# Patient Record
Sex: MALE | Race: WHITE | NOT HISPANIC OR LATINO | URBAN - METROPOLITAN AREA
[De-identification: names, ages, dates, MRNs, and addresses within clinical notes are randomized per-mention and may not be internally consistent; named-entity substitution may affect disease eponyms.]

---

## 2019-08-23 ENCOUNTER — INPATIENT (INPATIENT)
Facility: HOSPITAL | Age: 34
LOS: 3 days | Discharge: HOME | End: 2019-08-27
Attending: STUDENT IN AN ORGANIZED HEALTH CARE EDUCATION/TRAINING PROGRAM | Admitting: STUDENT IN AN ORGANIZED HEALTH CARE EDUCATION/TRAINING PROGRAM
Payer: COMMERCIAL

## 2019-08-23 VITALS
TEMPERATURE: 96 F | HEART RATE: 67 BPM | RESPIRATION RATE: 18 BRPM | OXYGEN SATURATION: 99 % | SYSTOLIC BLOOD PRESSURE: 149 MMHG | DIASTOLIC BLOOD PRESSURE: 88 MMHG

## 2019-08-23 LAB
ALBUMIN SERPL ELPH-MCNC: 4.3 G/DL — SIGNIFICANT CHANGE UP (ref 3.5–5.2)
ALP SERPL-CCNC: 77 U/L — SIGNIFICANT CHANGE UP (ref 30–115)
ALT FLD-CCNC: 15 U/L — SIGNIFICANT CHANGE UP (ref 0–41)
ANION GAP SERPL CALC-SCNC: 11 MMOL/L — SIGNIFICANT CHANGE UP (ref 7–14)
APPEARANCE UR: CLEAR — SIGNIFICANT CHANGE UP
AST SERPL-CCNC: 16 U/L — SIGNIFICANT CHANGE UP (ref 0–41)
BASOPHILS # BLD AUTO: 0.03 K/UL — SIGNIFICANT CHANGE UP (ref 0–0.2)
BASOPHILS NFR BLD AUTO: 0.3 % — SIGNIFICANT CHANGE UP (ref 0–1)
BILIRUB SERPL-MCNC: 0.4 MG/DL — SIGNIFICANT CHANGE UP (ref 0.2–1.2)
BILIRUB UR-MCNC: NEGATIVE — SIGNIFICANT CHANGE UP
BLD GP AB SCN SERPL QL: SIGNIFICANT CHANGE UP
BUN SERPL-MCNC: 8 MG/DL — LOW (ref 10–20)
CALCIUM SERPL-MCNC: 9.5 MG/DL — SIGNIFICANT CHANGE UP (ref 8.5–10.1)
CHLORIDE SERPL-SCNC: 98 MMOL/L — SIGNIFICANT CHANGE UP (ref 98–110)
CO2 SERPL-SCNC: 26 MMOL/L — SIGNIFICANT CHANGE UP (ref 17–32)
COLOR SPEC: SIGNIFICANT CHANGE UP
CREAT SERPL-MCNC: 0.8 MG/DL — SIGNIFICANT CHANGE UP (ref 0.7–1.5)
DIFF PNL FLD: NEGATIVE — SIGNIFICANT CHANGE UP
EOSINOPHIL # BLD AUTO: 0.4 K/UL — SIGNIFICANT CHANGE UP (ref 0–0.7)
EOSINOPHIL NFR BLD AUTO: 3.7 % — SIGNIFICANT CHANGE UP (ref 0–8)
GLUCOSE SERPL-MCNC: 112 MG/DL — HIGH (ref 70–99)
GLUCOSE UR QL: NEGATIVE — SIGNIFICANT CHANGE UP
HCT VFR BLD CALC: 43.8 % — SIGNIFICANT CHANGE UP (ref 42–52)
HGB BLD-MCNC: 15.2 G/DL — SIGNIFICANT CHANGE UP (ref 14–18)
IMM GRANULOCYTES NFR BLD AUTO: 0.4 % — HIGH (ref 0.1–0.3)
KETONES UR-MCNC: NEGATIVE — SIGNIFICANT CHANGE UP
LEUKOCYTE ESTERASE UR-ACNC: NEGATIVE — SIGNIFICANT CHANGE UP
LYMPHOCYTES # BLD AUTO: 1.76 K/UL — SIGNIFICANT CHANGE UP (ref 1.2–3.4)
LYMPHOCYTES # BLD AUTO: 16.4 % — LOW (ref 20.5–51.1)
MCHC RBC-ENTMCNC: 30.5 PG — SIGNIFICANT CHANGE UP (ref 27–31)
MCHC RBC-ENTMCNC: 34.7 G/DL — SIGNIFICANT CHANGE UP (ref 32–37)
MCV RBC AUTO: 88 FL — SIGNIFICANT CHANGE UP (ref 80–94)
MONOCYTES # BLD AUTO: 0.6 K/UL — SIGNIFICANT CHANGE UP (ref 0.1–0.6)
MONOCYTES NFR BLD AUTO: 5.6 % — SIGNIFICANT CHANGE UP (ref 1.7–9.3)
NEUTROPHILS # BLD AUTO: 7.93 K/UL — HIGH (ref 1.4–6.5)
NEUTROPHILS NFR BLD AUTO: 73.6 % — SIGNIFICANT CHANGE UP (ref 42.2–75.2)
NITRITE UR-MCNC: NEGATIVE — SIGNIFICANT CHANGE UP
NRBC # BLD: 0 /100 WBCS — SIGNIFICANT CHANGE UP (ref 0–0)
PH UR: 7 — SIGNIFICANT CHANGE UP (ref 5–8)
PLATELET # BLD AUTO: 203 K/UL — SIGNIFICANT CHANGE UP (ref 130–400)
POTASSIUM SERPL-MCNC: 4.2 MMOL/L — SIGNIFICANT CHANGE UP (ref 3.5–5)
POTASSIUM SERPL-SCNC: 4.2 MMOL/L — SIGNIFICANT CHANGE UP (ref 3.5–5)
PROT SERPL-MCNC: 6.6 G/DL — SIGNIFICANT CHANGE UP (ref 6–8)
PROT UR-MCNC: NEGATIVE — SIGNIFICANT CHANGE UP
RBC # BLD: 4.98 M/UL — SIGNIFICANT CHANGE UP (ref 4.7–6.1)
RBC # FLD: 12.6 % — SIGNIFICANT CHANGE UP (ref 11.5–14.5)
SODIUM SERPL-SCNC: 135 MMOL/L — SIGNIFICANT CHANGE UP (ref 135–146)
SP GR SPEC: 1.01 — LOW (ref 1.01–1.02)
UROBILINOGEN FLD QL: SIGNIFICANT CHANGE UP
WBC # BLD: 10.76 K/UL — SIGNIFICANT CHANGE UP (ref 4.8–10.8)
WBC # FLD AUTO: 10.76 K/UL — SIGNIFICANT CHANGE UP (ref 4.8–10.8)

## 2019-08-23 PROCEDURE — 99285 EMERGENCY DEPT VISIT HI MDM: CPT

## 2019-08-23 PROCEDURE — 99222 1ST HOSP IP/OBS MODERATE 55: CPT

## 2019-08-23 RX ORDER — MIRTAZAPINE 45 MG/1
15 TABLET, ORALLY DISINTEGRATING ORAL AT BEDTIME
Refills: 0 | Status: DISCONTINUED | OUTPATIENT
Start: 2019-08-23 | End: 2019-08-27

## 2019-08-23 RX ORDER — ONDANSETRON 8 MG/1
4 TABLET, FILM COATED ORAL EVERY 6 HOURS
Refills: 0 | Status: DISCONTINUED | OUTPATIENT
Start: 2019-08-23 | End: 2019-08-27

## 2019-08-23 RX ORDER — HEPARIN SODIUM 5000 [USP'U]/ML
5000 INJECTION INTRAVENOUS; SUBCUTANEOUS EVERY 8 HOURS
Refills: 0 | Status: DISCONTINUED | OUTPATIENT
Start: 2019-08-23 | End: 2019-08-27

## 2019-08-23 RX ORDER — MORPHINE SULFATE 50 MG/1
4 CAPSULE, EXTENDED RELEASE ORAL EVERY 4 HOURS
Refills: 0 | Status: DISCONTINUED | OUTPATIENT
Start: 2019-08-23 | End: 2019-08-23

## 2019-08-23 RX ORDER — MIRTAZAPINE 45 MG/1
15 TABLET, ORALLY DISINTEGRATING ORAL ONCE
Refills: 0 | Status: COMPLETED | OUTPATIENT
Start: 2019-08-23 | End: 2019-08-23

## 2019-08-23 RX ORDER — GABAPENTIN 400 MG/1
100 CAPSULE ORAL ONCE
Refills: 0 | Status: COMPLETED | OUTPATIENT
Start: 2019-08-23 | End: 2019-08-23

## 2019-08-23 RX ORDER — TRAZODONE HCL 50 MG
100 TABLET ORAL ONCE
Refills: 0 | Status: COMPLETED | OUTPATIENT
Start: 2019-08-23 | End: 2019-08-23

## 2019-08-23 RX ORDER — TRAZODONE HCL 50 MG
100 TABLET ORAL AT BEDTIME
Refills: 0 | Status: DISCONTINUED | OUTPATIENT
Start: 2019-08-23 | End: 2019-08-27

## 2019-08-23 RX ORDER — SODIUM CHLORIDE 9 MG/ML
1000 INJECTION, SOLUTION INTRAVENOUS ONCE
Refills: 0 | Status: COMPLETED | OUTPATIENT
Start: 2019-08-23 | End: 2019-08-23

## 2019-08-23 RX ORDER — MORPHINE SULFATE 50 MG/1
4 CAPSULE, EXTENDED RELEASE ORAL EVERY 6 HOURS
Refills: 0 | Status: DISCONTINUED | OUTPATIENT
Start: 2019-08-23 | End: 2019-08-26

## 2019-08-23 RX ORDER — SODIUM CHLORIDE 9 MG/ML
1000 INJECTION INTRAMUSCULAR; INTRAVENOUS; SUBCUTANEOUS
Refills: 0 | Status: DISCONTINUED | OUTPATIENT
Start: 2019-08-23 | End: 2019-08-24

## 2019-08-23 RX ORDER — GABAPENTIN 400 MG/1
100 CAPSULE ORAL AT BEDTIME
Refills: 0 | Status: DISCONTINUED | OUTPATIENT
Start: 2019-08-23 | End: 2019-08-24

## 2019-08-23 RX ORDER — ACETAMINOPHEN 500 MG
650 TABLET ORAL ONCE
Refills: 0 | Status: COMPLETED | OUTPATIENT
Start: 2019-08-23 | End: 2019-08-23

## 2019-08-23 RX ORDER — PANTOPRAZOLE SODIUM 20 MG/1
40 TABLET, DELAYED RELEASE ORAL
Refills: 0 | Status: DISCONTINUED | OUTPATIENT
Start: 2019-08-23 | End: 2019-08-27

## 2019-08-23 RX ADMIN — MIRTAZAPINE 15 MILLIGRAM(S): 45 TABLET, ORALLY DISINTEGRATING ORAL at 22:02

## 2019-08-23 RX ADMIN — Medication 650 MILLIGRAM(S): at 03:00

## 2019-08-23 RX ADMIN — MIRTAZAPINE 15 MILLIGRAM(S): 45 TABLET, ORALLY DISINTEGRATING ORAL at 06:49

## 2019-08-23 RX ADMIN — HEPARIN SODIUM 5000 UNIT(S): 5000 INJECTION INTRAVENOUS; SUBCUTANEOUS at 14:17

## 2019-08-23 RX ADMIN — GABAPENTIN 100 MILLIGRAM(S): 400 CAPSULE ORAL at 22:02

## 2019-08-23 RX ADMIN — HEPARIN SODIUM 5000 UNIT(S): 5000 INJECTION INTRAVENOUS; SUBCUTANEOUS at 22:02

## 2019-08-23 RX ADMIN — Medication 100 MILLIGRAM(S): at 22:02

## 2019-08-23 RX ADMIN — GABAPENTIN 100 MILLIGRAM(S): 400 CAPSULE ORAL at 06:49

## 2019-08-23 RX ADMIN — SODIUM CHLORIDE 1000 MILLILITER(S): 9 INJECTION, SOLUTION INTRAVENOUS at 02:30

## 2019-08-23 RX ADMIN — Medication 650 MILLIGRAM(S): at 02:30

## 2019-08-23 RX ADMIN — SODIUM CHLORIDE 125 MILLILITER(S): 9 INJECTION INTRAMUSCULAR; INTRAVENOUS; SUBCUTANEOUS at 06:49

## 2019-08-23 RX ADMIN — Medication 100 MILLIGRAM(S): at 06:49

## 2019-08-23 RX ADMIN — MORPHINE SULFATE 4 MILLIGRAM(S): 50 CAPSULE, EXTENDED RELEASE ORAL at 14:16

## 2019-08-23 NOTE — ED PROVIDER NOTE - NS ED ROS FT
Constitutional:  No fevers or chills.  Eyes:  No visual changes.  ENT:  No sore throat.  Neck:  No neck pain or stiffness.  Cardiac:  No CP or edema.  Resp:  No cough or SOB.  GI:  No nausea, vomiting, diarrhea. +Abd pain.  :  +Dysuria. No frequency or hematuria.  MSK:  No myalgias or joint pain/swelling.  Neuro:  No headache, dizziness, or weakness.  Skin:  No skin rash.

## 2019-08-23 NOTE — H&P ADULT - ASSESSMENT
Patient is a 33 y/o male with pmhx schizophrenia presented with left groin pain and swelling. Left groin swelling is about 4sfl5tg in size, non reducible, most likely an inguinal hernia.    Plan  -NPO  -IVF  -OR today with Dr. Gandhi Patient is a 35 y/o male with pmhx schizophrenia presented with left groin pain and swelling. Left groin swelling is about 6iuy5mf in size, non reducible, most likely an inguinal hernia.    Plan  -NPO  -IVF  -OR today with Dr. Gandhi    Senior Resident Note  Pt seen and examined, LT inguinal hernia reduced bedside  plan for hernia repair on this admission  Above note has been reviewed and edited  Plan d/w patient and Dr. aGndhi

## 2019-08-23 NOTE — ED ADULT NURSE NOTE - OBJECTIVE STATEMENT
Patient was seen and examined by myself. Case was discussed with house staff in details. I have reviewed and agree with the plan as outlined above with edits where appropriate.  Vital Signs Last 24 Hrs  T(C): 36.9 (12 Jan 2019 14:49), Max: 37 (11 Jan 2019 20:58)  T(F): 98.5 (12 Jan 2019 14:49), Max: 98.6 (11 Jan 2019 20:58)  HR: 105 (12 Jan 2019 14:49) (93 - 115)  BP: 151/73 (12 Jan 2019 14:49) (151/73 - 181/77)  BP(mean): --  RR: 18 (12 Jan 2019 14:49) (17 - 18)  SpO2: 99% (12 Jan 2019 14:49) (99% - 100%)      Labs- no new labs today      A/P: 89 y/o F with   1. Acute respiratory distress due to corona virus upper respiratory infection  2. Dementia with functional quadriplegia  3. Dysphagia  4. essential HTN  5. Frailty    Supportive measures  No fever; monitor off antibiotics- stop Unasyn  Optimize BP control  Aspiration precautions  Discharge planning.  Other plan as outlined above 33 y/o M pt, present to ED for abd and groin pain, pt had a lump to left groin for 3 year, pain started today, also report lower quadrant pain, on exam abd soft, NT, ND, + BS in all 4 quadrant, neg CVA tenderness, denies N/V/D, fever, chills, dysuria, hematuria, melena, seen and eval by MD, ronny placed, labs drawn and sent, pain med and IV fluids given as ordered

## 2019-08-23 NOTE — ED PROVIDER NOTE - PHYSICAL EXAMINATION
PHYSICAL EXAM: I have reviewed current vital signs.  GENERAL: NAD, well-nourished; well-developed.  HEAD:  Normocephalic, atraumatic.  EYES: Conjunctiva and sclera clear.  ENT: MMM.  NECK: Supple, full ROM.  CHEST/LUNG: Clear to auscultation bilaterally; no wheezes, rales, or rhonchi.  HEART: Regular rate and rhythm, normal S1 and S2; no murmurs, rubs, or gallops.  ABDOMEN: Soft, nondistended, +left inguinal hernia with TTP. No peritoneal signs. Testicles descended.  EXTREMITIES:  2+ peripheral pulses; FROM.  PSYCH: Cooperative, appropriate, normal mood and affect.  NEUROLOGY: A&O x 3. Motor 5/5. No focal neurological deficits.  SKIN: Warm and dry.

## 2019-08-23 NOTE — H&P ADULT - HISTORY OF PRESENT ILLNESS
Patient is a 35 y/o male with pmhx schizophrenia presented with left groin pain and swelling.  Patient said the pain started 3 years ago and has gotten progressively worse, the swelling has also gotten bigger. He says starting last night the pain has gotten unbearable, sharp pain non radiating. denies nausea, vomiting, dysuria,  fevers and chills. Patient is unemployed. he has history of schizophrenia managed by psychiatrist Dr. MILLER at [facility] manages his condition Patient is a 33 y/o male with pmhx schizophrenia presented with left groin pain and swelling.  Patient said the pain started 3 years ago and has gotten progressively worse, the swelling has also gotten bigger. He says starting last night the pain has gotten unbearable, sharp pain non radiating. denies nausea, vomiting, dysuria,  fevers and chills. Patient is unemployed. he has history of schizophrenia managed by psychiatrist Dr. MILLER at [facility] manages his condition.

## 2019-08-23 NOTE — H&P ADULT - NSHPPHYSICALEXAM_GEN_ALL_CORE
GENERAL: NAD  HEAD:  Atraumatic, Normocephalic  EYES: EOMI, PERRLA, conjunctiva and sclera clear  NECK: Supple, No JVD  CHEST/LUNG: Clear to auscultation bilaterally;  HEART: Regular rate and rhythm; No murmurs, rubs, or gallops  ABDOMEN: Bowel sounds present; Soft, Nontender, Nondistended.   : Left groin ~5cm x 5cm non-reducible swelling

## 2019-08-23 NOTE — H&P ADULT - NSHPREVIEWOFSYSTEMS_GEN_ALL_CORE
REVIEW OF SYSTEMS:    CONSTITUTIONAL: No weakness, fevers or chills  EYES/ENT: No visual changes;  No vertigo or throat pain   NECK: No pain or stiffness  RESPIRATORY: No cough, wheezing, hemoptysis; No shortness of breath  CARDIOVASCULAR: No chest pain or palpitations  GASTROINTESTINAL: +groin pain No abdominal pain. No nausea, vomiting, or hematemesis; No diarrhea or constipation. No melena or hematochezia.  GENITOURINARY: No dysuria, frequency or hematuria  NEUROLOGICAL: No numbness or weakness  SKIN: No itching, rashes

## 2019-08-23 NOTE — ED PROVIDER NOTE - OBJECTIVE STATEMENT
33yo M with PMH of hernia and schizophrenia presenting to ED with left inguinal bulge/pain since 6PM last night. Patient states he has had the hernia for about 3 yrs and normally it reduces/goes away. Denies any injuries/traumas/falls, CP, SOB, back pain, testicular pain, or difficulty urinating/hematuria. Endorses dysuria x 2 days. +Smoker.

## 2019-08-23 NOTE — ED PROVIDER NOTE - ATTENDING CONTRIBUTION TO CARE
34 year old male, pmhx inguinal hernia, schizophrenia, presenting with left inguinal hernia pain since 6pm last night. Patient states the pain is sharp, severe, non-radiating, no palliative or provocative factors, 10/10 at its worst. States normally he is able to reduce the hernia but has not been able to this time. Otherwise denies fevers or other symptoms. Last BM yesterday and normal.    Vital Signs: I have reviewed the initial vital signs.  Constitutional: NAD, well-nourished, appears stated age, no acute distress.  HEENT: Airway patent, moist MM, no erythema/swelling/deformity of oral structures. EOMI, PERRLA.  CV: regular rate, regular rhythm, well-perfused extremities, 2+ b/l DP and radial pulses equal.  Lungs: BCTA, no increased WOB.  ABD: NTND, no guarding or rebound, no pulsatile mass, no hernias. (+) Left inguinal hernia hard to touch, unable to reduce it at bedside.    MSK: Neck supple, nontender, nl ROM, no stepoff. Chest nontender. Back nontender in TLS spine or to b/l bony structures or flanks. Ext nontender, nl rom, no deformity.   INTEG: Skin warm, dry, no rash.  NEURO: A&Ox3, normal strength, nl sensation throughout, normal speech.   PSYCH: Calm, cooperative, normal affect and interaction.    Will speak with surgery since unable to reduce hernia, obtain labs, imaging, pain control, re-eval.

## 2019-08-23 NOTE — H&P ADULT - ATTENDING COMMENTS
33yo male with schizophrenia presented with acutely incarcerated left inguinal hernia, reduced in ER. Plan for surgical repair today. Risks, benefits, and alternatives were explained the patient, including bleeding, infection, hernia, and injury to neighboring structures. All questions were answered. Consent was obtained for open left inguinal hernia repair with mesh.

## 2019-08-23 NOTE — ED ADULT NURSE NOTE - NSIMPLEMENTINTERV_GEN_ALL_ED
Implemented All Universal Safety Interventions:  [place] to call system. Call bell, personal items and telephone within reach. Instruct patient to call for assistance. Room bathroom lighting operational. Non-slip footwear when patient is off stretcher. Physically safe environment: no spills, clutter or unnecessary equipment. Stretcher in lowest position, wheels locked, appropriate side rails in place.

## 2019-08-24 PROCEDURE — 99232 SBSQ HOSP IP/OBS MODERATE 35: CPT

## 2019-08-24 RX ORDER — GABAPENTIN 400 MG/1
300 CAPSULE ORAL EVERY 8 HOURS
Refills: 0 | Status: DISCONTINUED | OUTPATIENT
Start: 2019-08-24 | End: 2019-08-27

## 2019-08-24 RX ADMIN — GABAPENTIN 300 MILLIGRAM(S): 400 CAPSULE ORAL at 21:57

## 2019-08-24 RX ADMIN — MIRTAZAPINE 15 MILLIGRAM(S): 45 TABLET, ORALLY DISINTEGRATING ORAL at 21:57

## 2019-08-24 RX ADMIN — Medication 100 MILLIGRAM(S): at 21:57

## 2019-08-24 RX ADMIN — GABAPENTIN 300 MILLIGRAM(S): 400 CAPSULE ORAL at 13:07

## 2019-08-24 RX ADMIN — HEPARIN SODIUM 5000 UNIT(S): 5000 INJECTION INTRAVENOUS; SUBCUTANEOUS at 13:07

## 2019-08-24 RX ADMIN — HEPARIN SODIUM 5000 UNIT(S): 5000 INJECTION INTRAVENOUS; SUBCUTANEOUS at 21:57

## 2019-08-24 RX ADMIN — HEPARIN SODIUM 5000 UNIT(S): 5000 INJECTION INTRAVENOUS; SUBCUTANEOUS at 05:05

## 2019-08-24 RX ADMIN — PANTOPRAZOLE SODIUM 40 MILLIGRAM(S): 20 TABLET, DELAYED RELEASE ORAL at 05:05

## 2019-08-24 NOTE — PROGRESS NOTE ADULT - ASSESSMENT
A/P:  PRISCILLA MALAVE is a 34yMale HD/POD 1  mtx schizophrenia presented with left groin pain and swelling. Left groin swelling is about 1fso4kx in size, non reducible, most likely an inguinal hernia.      Plan:   to OR on monday  may eat  continue home meds

## 2019-08-24 NOTE — PROGRESS NOTE ADULT - SUBJECTIVE AND OBJECTIVE BOX
GENERAL SURGERY PROGRESS NOTE     FRIEDA PRISCILLA  [address] [place] [address] day :1d  POD:  Procedure:   Surgical Attending: Juana Ganhdi  Overnight events: No acute overnight events. denies f/c/cp/sob.    T(F): 96.4 (19 @ 23:00), Max: 98.5 (19 @ 16:42)  HR: 95 (19 @ 23:00) (53 - 95)  BP: 115/56 (19 @ 23:00) (102/59 - 121/65)  ABP: --  ABP(mean): --  RR: 18 (19 @ 23:00) (18 - 18)  SpO2: 99% (19 @ 14:58) (98% - 99%)      DIET/FLUIDS: sodium chloride 0.9%. 1000 milliLiter(s) IV Continuous <Continuous>    GI proph:  pantoprazole    Tablet 40 milliGRAM(s) Oral before breakfast    AC/ proph: heparin  Injectable 5000 Unit(s) SubCutaneous every 8 hours    ABx:     PHYSICAL EXAM:  GENERAL: NAD, well-appearing  CHEST/LUNG: Clear to auscultation bilaterally  HEART: Regular rate and rhythm  ABDOMEN: Soft, Nontender, Nondistended; Left groin ~5cm x 5cm non-reducible swelling  EXTREMITIES:  No clubbing, cyanosis, or edema      LABS  Labs:  CAPILLARY BLOOD GLUCOSE                            15.2   10.76 )-----------( 203      ( 23 Aug 2019 02:07 )             43.8       Auto Neutrophil %: 73.6 % (19 @ 02:07)  Auto Immature Granulocyte %: 0.4 % (19 @ 02:07)        135  |  98  |  8<L>  ----------------------------<  112<H>  4.2   |  26  |  0.8      Calcium, Total Serum: 9.5 mg/dL (19 @ 02:07)      LFTs:             6.6  | 0.4  | 16       ------------------[77      ( 23 Aug 2019 02:07 )  4.3  | x    | 15          Lipase:x      Amylase:x           Urinalysis Basic - ( 23 Aug 2019 06:30 )    Color: Light Yellow / Appearance: Clear / S.008 / pH: x  Gluc: x / Ketone: Negative  / Bili: Negative / Urobili: <2 mg/dL   Blood: x / Protein: Negative / Nitrite: Negative   Leuk Esterase: Negative / RBC: x / WBC x   Sq Epi: x / Non Sq Epi: x / Bacteria: x            RADIOLOGY & ADDITIONAL TESTS:

## 2019-08-25 PROCEDURE — 99232 SBSQ HOSP IP/OBS MODERATE 35: CPT

## 2019-08-25 PROCEDURE — 93010 ELECTROCARDIOGRAM REPORT: CPT

## 2019-08-25 PROCEDURE — 71045 X-RAY EXAM CHEST 1 VIEW: CPT | Mod: 26

## 2019-08-25 RX ADMIN — HEPARIN SODIUM 5000 UNIT(S): 5000 INJECTION INTRAVENOUS; SUBCUTANEOUS at 21:45

## 2019-08-25 RX ADMIN — GABAPENTIN 300 MILLIGRAM(S): 400 CAPSULE ORAL at 06:54

## 2019-08-25 RX ADMIN — HEPARIN SODIUM 5000 UNIT(S): 5000 INJECTION INTRAVENOUS; SUBCUTANEOUS at 06:54

## 2019-08-25 RX ADMIN — GABAPENTIN 300 MILLIGRAM(S): 400 CAPSULE ORAL at 21:44

## 2019-08-25 RX ADMIN — GABAPENTIN 300 MILLIGRAM(S): 400 CAPSULE ORAL at 13:09

## 2019-08-25 RX ADMIN — HEPARIN SODIUM 5000 UNIT(S): 5000 INJECTION INTRAVENOUS; SUBCUTANEOUS at 13:09

## 2019-08-25 RX ADMIN — MIRTAZAPINE 15 MILLIGRAM(S): 45 TABLET, ORALLY DISINTEGRATING ORAL at 21:44

## 2019-08-25 RX ADMIN — PANTOPRAZOLE SODIUM 40 MILLIGRAM(S): 20 TABLET, DELAYED RELEASE ORAL at 06:54

## 2019-08-25 RX ADMIN — Medication 100 MILLIGRAM(S): at 21:44

## 2019-08-25 NOTE — PROVIDER CONTACT NOTE (OTHER) - SITUATION
b/p 95/56 hr 61 , but was sleeping before b/p was taken. denies symptoms of dizziness.
MD padilla made aware pt has home medications Trazadone 100mg and Gabapentin 300mg at bedside. Non-formulary sheet is available for completion to send to pharmacy.

## 2019-08-25 NOTE — PROGRESS NOTE ADULT - ASSESSMENT
PRISCILLA MALAVE is a 34yMale HD/POD 1  mtx schizophrenia presented with left groin pain and swelling. Left groin swelling is about 9ymb8um in size, non reducible, most likely an inguinal hernia.      Plan:   OR on monday - pre-op today   regular diet until midnight   continue home meds

## 2019-08-25 NOTE — CHART NOTE - NSCHARTNOTEFT_GEN_A_CORE
Spoke with patient using Sweetie  #[id number].    Spoke with patient to consent for left inguinal hernia repair with Dr. Gandhi tomorrow 8/26. Explained to the patient that, as with every surgery there are risks such as bleeding, infection and damage to local structures. Patient understood these risks and was consented to the procedure but wished to decline a blood transfusion in the event of bleeding. All questions were answered and concerns were addressed at this time.

## 2019-08-26 ENCOUNTER — TRANSCRIPTION ENCOUNTER (OUTPATIENT)
Age: 34
End: 2019-08-26

## 2019-08-26 LAB
ANION GAP SERPL CALC-SCNC: 14 MMOL/L — SIGNIFICANT CHANGE UP (ref 7–14)
APTT BLD: 44.8 SEC — HIGH (ref 27–39.2)
BLD GP AB SCN SERPL QL: SIGNIFICANT CHANGE UP
BUN SERPL-MCNC: 9 MG/DL — LOW (ref 10–20)
CALCIUM SERPL-MCNC: 9.1 MG/DL — SIGNIFICANT CHANGE UP (ref 8.5–10.1)
CHLORIDE SERPL-SCNC: 100 MMOL/L — SIGNIFICANT CHANGE UP (ref 98–110)
CO2 SERPL-SCNC: 25 MMOL/L — SIGNIFICANT CHANGE UP (ref 17–32)
CREAT SERPL-MCNC: 0.8 MG/DL — SIGNIFICANT CHANGE UP (ref 0.7–1.5)
GLUCOSE SERPL-MCNC: 120 MG/DL — HIGH (ref 70–99)
HCT VFR BLD CALC: 42 % — SIGNIFICANT CHANGE UP (ref 42–52)
HGB BLD-MCNC: 14.4 G/DL — SIGNIFICANT CHANGE UP (ref 14–18)
INR BLD: 0.96 RATIO — SIGNIFICANT CHANGE UP (ref 0.65–1.3)
MAGNESIUM SERPL-MCNC: 1.9 MG/DL — SIGNIFICANT CHANGE UP (ref 1.8–2.4)
MCHC RBC-ENTMCNC: 30.3 PG — SIGNIFICANT CHANGE UP (ref 27–31)
MCHC RBC-ENTMCNC: 34.3 G/DL — SIGNIFICANT CHANGE UP (ref 32–37)
MCV RBC AUTO: 88.4 FL — SIGNIFICANT CHANGE UP (ref 80–94)
NRBC # BLD: 0 /100 WBCS — SIGNIFICANT CHANGE UP (ref 0–0)
PHOSPHATE SERPL-MCNC: 4.7 MG/DL — SIGNIFICANT CHANGE UP (ref 2.1–4.9)
PLATELET # BLD AUTO: 209 K/UL — SIGNIFICANT CHANGE UP (ref 130–400)
POTASSIUM SERPL-MCNC: 4.3 MMOL/L — SIGNIFICANT CHANGE UP (ref 3.5–5)
POTASSIUM SERPL-SCNC: 4.3 MMOL/L — SIGNIFICANT CHANGE UP (ref 3.5–5)
PROTHROM AB SERPL-ACNC: 11.1 SEC — SIGNIFICANT CHANGE UP (ref 9.95–12.87)
RBC # BLD: 4.75 M/UL — SIGNIFICANT CHANGE UP (ref 4.7–6.1)
RBC # FLD: 12.3 % — SIGNIFICANT CHANGE UP (ref 11.5–14.5)
SODIUM SERPL-SCNC: 139 MMOL/L — SIGNIFICANT CHANGE UP (ref 135–146)
WBC # BLD: 10.45 K/UL — SIGNIFICANT CHANGE UP (ref 4.8–10.8)
WBC # FLD AUTO: 10.45 K/UL — SIGNIFICANT CHANGE UP (ref 4.8–10.8)

## 2019-08-26 PROCEDURE — 99232 SBSQ HOSP IP/OBS MODERATE 35: CPT | Mod: 57

## 2019-08-26 PROCEDURE — 49505 PRP I/HERN INIT REDUC >5 YR: CPT | Mod: LT

## 2019-08-26 RX ORDER — IBUPROFEN 200 MG
1 TABLET ORAL
Qty: 0 | Refills: 0 | DISCHARGE
Start: 2019-08-26

## 2019-08-26 RX ORDER — GABAPENTIN 400 MG/1
0 CAPSULE ORAL
Qty: 30 | Refills: 0 | DISCHARGE

## 2019-08-26 RX ORDER — SODIUM CHLORIDE 9 MG/ML
1000 INJECTION INTRAMUSCULAR; INTRAVENOUS; SUBCUTANEOUS
Refills: 0 | Status: DISCONTINUED | OUTPATIENT
Start: 2019-08-26 | End: 2019-08-27

## 2019-08-26 RX ORDER — RANITIDINE HYDROCHLORIDE 150 MG/1
0 TABLET, FILM COATED ORAL
Qty: 60 | Refills: 0 | DISCHARGE

## 2019-08-26 RX ORDER — TRAZODONE HCL 50 MG
0 TABLET ORAL
Qty: 30 | Refills: 0 | DISCHARGE

## 2019-08-26 RX ORDER — OXYCODONE AND ACETAMINOPHEN 5; 325 MG/1; MG/1
1 TABLET ORAL EVERY 6 HOURS
Refills: 0 | Status: DISCONTINUED | OUTPATIENT
Start: 2019-08-26 | End: 2019-08-27

## 2019-08-26 RX ORDER — IBUPROFEN 200 MG
600 TABLET ORAL EVERY 6 HOURS
Refills: 0 | Status: DISCONTINUED | OUTPATIENT
Start: 2019-08-26 | End: 2019-08-27

## 2019-08-26 RX ORDER — MIRTAZAPINE 45 MG/1
0 TABLET, ORALLY DISINTEGRATING ORAL
Qty: 30 | Refills: 0 | DISCHARGE

## 2019-08-26 RX ADMIN — GABAPENTIN 300 MILLIGRAM(S): 400 CAPSULE ORAL at 06:07

## 2019-08-26 RX ADMIN — OXYCODONE AND ACETAMINOPHEN 1 TABLET(S): 5; 325 TABLET ORAL at 18:15

## 2019-08-26 RX ADMIN — MIRTAZAPINE 15 MILLIGRAM(S): 45 TABLET, ORALLY DISINTEGRATING ORAL at 21:21

## 2019-08-26 RX ADMIN — PANTOPRAZOLE SODIUM 40 MILLIGRAM(S): 20 TABLET, DELAYED RELEASE ORAL at 06:07

## 2019-08-26 RX ADMIN — HEPARIN SODIUM 5000 UNIT(S): 5000 INJECTION INTRAVENOUS; SUBCUTANEOUS at 21:21

## 2019-08-26 RX ADMIN — GABAPENTIN 300 MILLIGRAM(S): 400 CAPSULE ORAL at 21:21

## 2019-08-26 RX ADMIN — Medication 100 MILLIGRAM(S): at 21:21

## 2019-08-26 RX ADMIN — SODIUM CHLORIDE 125 MILLILITER(S): 9 INJECTION INTRAMUSCULAR; INTRAVENOUS; SUBCUTANEOUS at 00:48

## 2019-08-26 RX ADMIN — HEPARIN SODIUM 5000 UNIT(S): 5000 INJECTION INTRAVENOUS; SUBCUTANEOUS at 06:07

## 2019-08-26 NOTE — CHART NOTE - NSCHARTNOTEFT_GEN_A_CORE
Pre-op Clearance    PRISCILLA MALAVE  34yMale  LEFT INGUINAL HERNIA    Planned Procedure: Left inguinal hernia repair with possible mesh    Labs:             14.4   10.45 )-----------( 209      ( 26 Aug 2019 00:42 )             42.0       08-26    139  |  100  |  9<L>  ----------------------------<  120<H>  4.3   |  25  |  0.8    Calcium, Total Serum: 9.1 mg/dL (08-26-19 @ 00:42)    Coags:     11.10  ----< 0.96    ( 26 Aug 2019 00:42 )     44.8      ABO RH Interpretation: A NEG (08-26-19 @ 00:42)    Imaging Studies:  ECG:   Performed 8/26, not yet read    CXR:  < from: Xray Chest 1 View- PORTABLE-Urgent (08.25.19 @ 15:47) >  Impression:    No radiographic evidence of acute pulmonary disease.  < end of copied text >    Blood Products Needed: None    Current Diet Order: NPO

## 2019-08-26 NOTE — CHART NOTE - NSCHARTNOTEFT_GEN_A_CORE
Post Operative Check    Patient is post op from a Left femoral hernia repair (62675)   and is doing well. Denies any pain. +flatulence, -BM. Tolerating regular diet without N/V. Ambulating unassisted. Voided twice.     Vitals    T(C): 36.5 (08-26-19 @ 12:32), Max: 36.5 (08-26-19 @ 12:12)  HR: 77 (08-26-19 @ 12:32) (77 - 83)  BP: 130/80 (08-26-19 @ 12:32) (116/83 - 130/80)  RR: 18 (08-26-19 @ 12:32) (18 - 18)  SpO2: 99% (08-26-19 @ 12:32) (99% - 99%)  Wt(kg): --      08-25 @ 07:01  -  08-26 @ 07:00  --------------------------------------------------------  IN:    sodium chloride 0.9%.: 500 mL  Total IN: 500 mL    OUT:  Total OUT: 0 mL  Total NET: 500 mL      08-26 @ 07:01  -  08-26 @ 20:05  --------------------------------------------------------  IN:  sodium chloride 0.9%.: 500 mL  Total IN: 500 mL    OUT:  Total OUT: 0 mL  Total NET: 500 mL    Physical Exam  General: NAD AAOx3   Cards: RRR S1S2  Resp: CTAB  Abdomen: soft, non-distended, non-TTP, surgical glue in place over LLQ  Ext: NTBL    Labs  Labs:  CAPILLARY BLOOD GLUCOSE                      14.4   10.45 )-----------( 209      ( 26 Aug 2019 00:42 )             42.0         08-26    139  |  100  |  9<L>  ----------------------------<  120<H>  4.3   |  25  |  0.8      Calcium, Total Serum: 9.1 mg/dL (08-26-19 @ 00:42)  Coags:     11.10  ----< 0.96    ( 26 Aug 2019 00:42 )     44.8     Patient is a 34y old Male s/p left femoral hernia repair.   Plan:  - continue regular diet  - encourage OOB, IS  - discharge tomorrow AM pending continued improvement

## 2019-08-26 NOTE — DISCHARGE NOTE PROVIDER - NSDCCPCAREPLAN_GEN_ALL_CORE_FT
PRINCIPAL DISCHARGE DIAGNOSIS  Diagnosis: Femoral hernia of left side  Assessment and Plan of Treatment: s/p open femoral hernia repair with mesh.   Diet : Continue regular diet.  Dressings : You have surgical glue over your incision. Ok to shower normally. Do not scrub at the glue, it will fall off by itself.   Pain : Take pain medications as prescribed.  Activity : Please avoid heavy lifting (anything over 10 pounds) for at least 6 weeks.   Follow up : Call to schedule a follow up appointment in 2 weeks with Dr. Gadnhi.

## 2019-08-26 NOTE — DISCHARGE NOTE PROVIDER - NSDCACTIVITY_GEN_ALL_CORE
Stairs allowed/Walking - Indoors allowed/Return to Work/School allowed/No heavy lifting/straining/Bathing allowed/Walking - Outdoors allowed/Showering allowed

## 2019-08-26 NOTE — CHART NOTE - NSCHARTNOTEFT_GEN_A_CORE
PACU ANESTHESIA ADMISSION NOTE      Procedure: Left femoral hernia repair    Post op diagnosis:      ____  Intubated  TV:______       Rate: ______      FiO2: ______    __x__  Patent Airway    __x__  Full return of protective reflexes    __x__  Full recovery from anesthesia / back to baseline status    Vitals:  T(C): 96.8  HR: 81  BP: 140/80  RR: 12  SpO2: 100%    Mental Status:  __x__ Awake   __x___ Alert   _____ Drowsy   _____ Sedated    Nausea/Vomiting:  __x__ NO  ______Yes,   See Post - Op Orders          Pain Scale (0-10):  ___0__    Treatment: ____ None    ____ See Post - Op/PCA Orders    Post - Operative Fluids:   __x__ Oral   ____ See Post - Op Orders    Plan: Discharge:   ____Home       ___x__Floor     _____Critical Care    _____  Other:_________________    Comments: GETA and uneventful anesthesia course with no complications. VItals stable. Pt transferred to PACU. Please discharge to floor once criteria are met.

## 2019-08-26 NOTE — DISCHARGE NOTE PROVIDER - CARE PROVIDER_API CALL
Juana Gandhi)  [facility]  [address] [place] [address]  Phone: [phone]  Fax: [phone]  Follow Up Time: 2 weeks

## 2019-08-26 NOTE — PROGRESS NOTE ADULT - SUBJECTIVE AND OBJECTIVE BOX
GENERAL SURGERY PROGRESS NOTE     PRISCILLA MALAVE  34y  Male  Hospital day: 4d    OVERNIGHT EVENTS: no acute events overnight    T(F): 96.9 (08-25-19 @ 23:50), Max: 97.3 (08-25-19 @ 04:13)  HR: 83 (08-25-19 @ 23:50) (61 - 94)  BP: 119/63 (08-25-19 @ 23:50) (95/56 - 127/34)  RR: 18 (08-25-19 @ 23:50) (18 - 18)    DIET/FLUIDS: NPO/sodium chloride 0.9%. 1000 milliLiter(s) IV Continuous <Continuous>    GI proph:  pantoprazole    Tablet 40 milliGRAM(s) Oral before breakfast    AC/ proph: heparin  Injectable 5000 Unit(s) SubCutaneous every 8 hours    PHYSICAL EXAM:  GENERAL: NAD, well-appearing  CHEST/LUNG: Clear to auscultation bilaterally  HEART: Regular rate and rhythm  ABDOMEN: Soft, Nontender, Nondistended;  : left groin swelling   EXTREMITIES:  No clubbing, cyanosis, or edema    Labs:             14.4   10.45 )-----------( 209      ( 26 Aug 2019 00:42 )             42.0     08-26    139  |  100  |  9<L>  ----------------------------<  120<H>  4.3   |  25  |  0.8    Calcium, Total Serum: 9.1 mg/dL (08-26-19 @ 00:42)    Coags:     11.10  ----< 0.96    ( 26 Aug 2019 00:42 )     44.8

## 2019-08-26 NOTE — DISCHARGE NOTE PROVIDER - NSDCFUADDAPPT_GEN_ALL_CORE_FT
To schedule a follow-up appointment for your post-operative visit, please call the surgeon's office at the number provided above. Please follow up with your surgeon in 2 weeks.

## 2019-08-26 NOTE — PROVIDER CONTACT NOTE (MEDICATION) - SITUATION
contacted Md regarding 1800 dose of gabapentin and heparin however another dose is ordered for 2200. Md stated to hold 1800 dose and admin 2200 when due

## 2019-08-26 NOTE — PROGRESS NOTE ADULT - ASSESSMENT
Assessment:  Patient is a 33 y/o male with pmhx schizophrenia presented with left groin pain and swelling. Left groin swelling is about 1pej0mh in size, non reducible, most likely an inguinal hernia.    Plan:  -NPO  -IVF  -OR 8/26 for inguinal hernia repair

## 2019-08-26 NOTE — DISCHARGE NOTE PROVIDER - HOSPITAL COURSE
34M w/ PMH of schizophrenia presented to the ED with 3 years of worsening L groin pain and swelling. Hernia was non-reducible at bedside and patient was taken to the OR, where his hernia was discovered to be a fat-containing femoral hernia. A hernia repair with mesh was performed and post-operatively the patient was able to tolerate a regular diet, ambulate unassisted, void spontaneously, and have pain controlled. 34M w/ PMH of schizophrenia and chronic left groin pain off/on for the past 3 years, presented to the ED with acute worsening left groin pain and swelling. Hernia was non-reducible at bedside and patient was taken to the OR, where his hernia was discovered to be a fat-containing femoral hernia. A hernia repair with mesh was performed and post-operatively the patient was able to tolerate a regular diet, ambulate unassisted, void spontaneously, and have pain controlled.

## 2019-08-27 ENCOUNTER — TRANSCRIPTION ENCOUNTER (OUTPATIENT)
Age: 34
End: 2019-08-27

## 2019-08-27 VITALS
HEART RATE: 76 BPM | SYSTOLIC BLOOD PRESSURE: 101 MMHG | TEMPERATURE: 98 F | RESPIRATION RATE: 18 BRPM | DIASTOLIC BLOOD PRESSURE: 58 MMHG

## 2019-08-27 PROCEDURE — 99024 POSTOP FOLLOW-UP VISIT: CPT

## 2019-08-27 RX ADMIN — HEPARIN SODIUM 5000 UNIT(S): 5000 INJECTION INTRAVENOUS; SUBCUTANEOUS at 15:05

## 2019-08-27 RX ADMIN — OXYCODONE AND ACETAMINOPHEN 1 TABLET(S): 5; 325 TABLET ORAL at 05:20

## 2019-08-27 RX ADMIN — HEPARIN SODIUM 5000 UNIT(S): 5000 INJECTION INTRAVENOUS; SUBCUTANEOUS at 05:21

## 2019-08-27 RX ADMIN — OXYCODONE AND ACETAMINOPHEN 1 TABLET(S): 5; 325 TABLET ORAL at 00:45

## 2019-08-27 RX ADMIN — GABAPENTIN 300 MILLIGRAM(S): 400 CAPSULE ORAL at 15:05

## 2019-08-27 RX ADMIN — PANTOPRAZOLE SODIUM 40 MILLIGRAM(S): 20 TABLET, DELAYED RELEASE ORAL at 05:20

## 2019-08-27 RX ADMIN — OXYCODONE AND ACETAMINOPHEN 1 TABLET(S): 5; 325 TABLET ORAL at 11:51

## 2019-08-27 RX ADMIN — OXYCODONE AND ACETAMINOPHEN 1 TABLET(S): 5; 325 TABLET ORAL at 11:52

## 2019-08-27 RX ADMIN — OXYCODONE AND ACETAMINOPHEN 1 TABLET(S): 5; 325 TABLET ORAL at 05:55

## 2019-08-27 RX ADMIN — GABAPENTIN 300 MILLIGRAM(S): 400 CAPSULE ORAL at 05:20

## 2019-08-27 RX ADMIN — OXYCODONE AND ACETAMINOPHEN 1 TABLET(S): 5; 325 TABLET ORAL at 00:12

## 2019-08-27 NOTE — PROGRESS NOTE ADULT - ASSESSMENT
34M s/p L femoral hernia repair with mesh.     Plan:   -encourage ambulation, IS  -pain control  -plan for discharge 8/27 AM pending continued improvement

## 2019-08-27 NOTE — PROGRESS NOTE ADULT - ATTENDING COMMENTS
33yo male with inguinal hernia for repair today. Risks, benefits, and alternatives were explained the patient, including bleeding, infection, hernia, and injury to neighboring structures. All questions were answered.
35yo male POD#1 s/p left femoral hernia repair with mesh. States he is feeling well. Pain controlled. Tolerating diet. Voiding freely. Discharge home. No lifting >10lbs x 2 weeks; may lift >10lbs after 2 weeks but avoid straining or excessive activity x until 6 weeks post-operatively. May shower, do not bathe, do not scrub wounds. May use ice packs for pain and swelling. Make an appointment with Dr. Gandhi in 2 weeks [phone]. Call with questions or concerns, including fevers, worsening pain, pus from the wounds, redness of the skin.
stable and afebrile.  No n/v.  passing flatus and BM.  left inguinal hernia, non-reducible.    a/p;  left inguinal hen cat, chronically incarcerated.  Surgery next week by Dr. Gandhi.
Patient seen and examined with surgery team on rounds and discussed management plans. Patient may have L femoral hernia soft nontender non reducible swelling no bowel incarceration now, awaiting Surgical repair.

## 2019-08-27 NOTE — DISCHARGE NOTE NURSING/CASE MANAGEMENT/SOCIAL WORK - PATIENT PORTAL LINK FT
You can access the FollowMyHealth Patient Portal offered by [facility] by registering at the following website: [email or web address]. By joining [employer]’s FollowMyHealth portal, you will also be able to view your health information using other applications (apps) compatible with our system.

## 2019-08-27 NOTE — CHART NOTE - NSCHARTNOTEFT_GEN_A_CORE
Spoke with patient via  phone (ID: [id number]). We discussed his current condition, recent surgery, and future care plan. All questions were answered.

## 2019-08-27 NOTE — PROGRESS NOTE ADULT - SUBJECTIVE AND OBJECTIVE BOX
GENERAL SURGERY PROGRESS NOTE     PRISCILLA MALAVE  34y  Male  Hospital day :4d  POD:  Procedure: Left femoral hernia repair    OVERNIGHT EVENTS:  S/P L femoral hernia repair, POC WNL. Pain well controlled, ambulating unassisted, voiding freely. Tolerating regular diet.    T(F): 97.7 (08-26-19 @ 12:12), Max: 97.7 (08-26-19 @ 12:12)  HR: 77 (08-26-19 @ 12:32) (77 - 80)  BP: 130/80 (08-26-19 @ 12:32) (116/83 - 130/80)  ABP: --  ABP(mean): --  RR: 18 (08-26-19 @ 12:32) (18 - 18)  SpO2: 99% (08-26-19 @ 12:32) (99% - 99%)      GI proph:  pantoprazole    Tablet 40 milliGRAM(s) Oral before breakfast  AC/ proph: heparin  Injectable 5000 Unit(s) SubCutaneous every 8 hours    PHYSICAL EXAM:  GENERAL: NAD, well-appearing  CHEST/LUNG: Clear to auscultation bilaterally  HEART: Regular rate and rhythm  ABDOMEN: Soft, Nontender, Nondistended; L inguinal surgical glue in place  EXTREMITIES:  No clubbing, cyanosis, or edema      LABS  Labs:  CAPILLARY BLOOD GLUCOSE                        14.4   10.45 )-----------( 209      ( 26 Aug 2019 00:42 )             42.0       08-26    139  |  100  |  9<L>  ----------------------------<  120<H>  4.3   |  25  |  0.8      Calcium, Total Serum: 9.1 mg/dL (08-26-19 @ 00:42)    Coags:     11.10  ----< 0.96    ( 26 Aug 2019 00:42 )     44.8

## 2019-08-29 DIAGNOSIS — F17.210 NICOTINE DEPENDENCE, CIGARETTES, UNCOMPLICATED: ICD-10-CM

## 2019-08-29 DIAGNOSIS — K40.30 UNILATERAL INGUINAL HERNIA, WITH OBSTRUCTION, WITHOUT GANGRENE, NOT SPECIFIED AS RECURRENT: ICD-10-CM

## 2019-08-29 DIAGNOSIS — Z79.899 OTHER LONG TERM (CURRENT) DRUG THERAPY: ICD-10-CM

## 2019-08-29 DIAGNOSIS — F20.9 SCHIZOPHRENIA, UNSPECIFIED: ICD-10-CM

## 2019-09-04 PROBLEM — Z00.00 ENCOUNTER FOR PREVENTIVE HEALTH EXAMINATION: Status: ACTIVE | Noted: 2019-09-04

## 2019-09-06 ENCOUNTER — APPOINTMENT (OUTPATIENT)
Dept: SURGERY | Facility: CLINIC | Age: 34
End: 2019-09-06
Payer: MEDICARE

## 2019-09-06 VITALS
WEIGHT: 142 LBS | BODY MASS INDEX: 19.88 KG/M2 | SYSTOLIC BLOOD PRESSURE: 120 MMHG | HEIGHT: 71 IN | DIASTOLIC BLOOD PRESSURE: 74 MMHG

## 2019-09-06 DIAGNOSIS — F20.9 SCHIZOPHRENIA, UNSPECIFIED: ICD-10-CM

## 2019-09-06 DIAGNOSIS — F17.200 NICOTINE DEPENDENCE, UNSPECIFIED, UNCOMPLICATED: ICD-10-CM

## 2019-09-06 DIAGNOSIS — Z87.19 PERSONAL HISTORY OF OTHER DISEASES OF THE DIGESTIVE SYSTEM: ICD-10-CM

## 2019-09-06 DIAGNOSIS — K21.9 GASTRO-ESOPHAGEAL REFLUX DISEASE W/OUT ESOPHAGITIS: ICD-10-CM

## 2019-09-06 PROCEDURE — 99024 POSTOP FOLLOW-UP VISIT: CPT

## 2019-09-06 RX ORDER — RANITIDINE HYDROCHLORIDE 150 MG/1
150 CAPSULE ORAL
Refills: 0 | Status: ACTIVE | COMMUNITY

## 2019-09-06 RX ORDER — MIRTAZAPINE 15 MG/1
15 TABLET, FILM COATED ORAL
Refills: 0 | Status: ACTIVE | COMMUNITY

## 2019-09-06 RX ORDER — GABAPENTIN 100 MG
100 TABLET ORAL
Refills: 0 | Status: ACTIVE | COMMUNITY

## 2019-09-06 RX ORDER — TRAZODONE HYDROCHLORIDE 300 MG/1
TABLET ORAL
Refills: 0 | Status: ACTIVE | COMMUNITY

## 2019-09-06 NOTE — HISTORY OF PRESENT ILLNESS
[de-identified] : 35yo male recently admitted for left inguinal hernia s/p open femoral hernia repair with mesh 8/26/2019. Patient was discharged home the following day after tolerating diet and pain control. Since then, patient reports feeling well. Denies fever/chills, nausea/vomiting, chest pain or shortness of breath. +BM. Denies lifting over 10lbs.  Sister reports that he is smoking several cigarettes a day.

## 2019-09-06 NOTE — PHYSICAL EXAM
[Normal Breath Sounds] : Normal breath sounds [de-identified] : soft, well healing left inguinal incision [de-identified] : no acute distress [Normal Heart Sounds] : normal heart sounds

## 2019-09-06 NOTE — ASSESSMENT
[FreeTextEntry1] : 33yo male s/p left femoral hernia repair with mesh. Doing well.\par -no heavy lifting x 6 weeks\par -drink water, monitor bowel function\par -counsellled to quit smoking cigarettes as it is associated with poor wound healing and recurrence\par -return to office PRN - particularly fevers, worsening abdominal pain, drainage from wound\par

## 2020-01-21 ENCOUNTER — OUTPATIENT (OUTPATIENT)
Dept: OUTPATIENT SERVICES | Facility: HOSPITAL | Age: 35
LOS: 1 days | Discharge: HOME | End: 2020-01-21
Payer: MEDICARE

## 2020-01-21 DIAGNOSIS — R06.00 DYSPNEA, UNSPECIFIED: ICD-10-CM

## 2020-01-21 PROCEDURE — 71046 X-RAY EXAM CHEST 2 VIEWS: CPT | Mod: 26

## 2020-01-26 NOTE — ED PROVIDER NOTE - CLINICAL SUMMARY MEDICAL DECISION MAKING FREE TEXT BOX
no abdominal pain, no bloating, no constipation, no diarrhea, no nausea and no vomiting. Patient presented with irreducible left inguinal hernia. Otherwise afebrile, HD stable, abd non-tender. Unable to reduce hernia manually on exam. Spoke with surgery who evaluated patient and recommended admission to their service for continued monitoring and possible surgical intervention. Labs obtained and otherwise grossly unremarkable. Patient agreeable with plan.

## 2021-08-15 NOTE — ED PROVIDER NOTE - NS ED ATTENDING STATEMENT MOD
[facility]
I have personally seen and examined this patient.  I have fully participated in the care of this patient. I have reviewed all pertinent clinical information, including history, physical exam, plan and the Resident’s note and agree except as noted.

## 2022-03-04 NOTE — BRIEF OPERATIVE NOTE - COMMENTS
----- Message from Young Crouch MD sent at 3/4/2022  5:18 AM EST -----  Ask him how he is doing  Kidney function is much worse   I want him to go to the hospital to be evaluated Dictation#[id number]

## 2024-01-03 NOTE — H&P ADULT - NSHPLABSRESULTS_GEN_ALL_CORE
Labs:  CAPILLARY BLOOD GLUCOSE               15.2   10.76 )-----------( 203      ( 23 Aug 2019 02:07 )             43.8       Auto Neutrophil %: 73.6 % (08-23-19 @ 02:07)  Auto Immature Granulocyte %: 0.4 % (08-23-19 @ 02:07)    08-23    135  |  98  |  8<L>  ----------------------------<  112<H>  4.2   |  26  |  0.8      Calcium, Total Serum: 9.5 mg/dL (08-23-19 @ 02:07)      LFTs:             6.6  | 0.4  | 16       ------------------[77      ( 23 Aug 2019 02:07 )  4.3  | x    | 15          Lipase:x      Amylase:x Hpi Title: Evaluation of a Skin Lesion Hpi Title: Evaluation of Skin Lesions